# Patient Record
Sex: FEMALE | Race: WHITE | URBAN - METROPOLITAN AREA
[De-identification: names, ages, dates, MRNs, and addresses within clinical notes are randomized per-mention and may not be internally consistent; named-entity substitution may affect disease eponyms.]

---

## 2022-04-13 ENCOUNTER — OFFICE VISIT (OUTPATIENT)
Dept: URBAN - METROPOLITAN AREA CLINIC 44 | Facility: CLINIC | Age: 67
End: 2022-04-13
Payer: MEDICARE

## 2022-04-13 DIAGNOSIS — H43.812 VITREOUS DEGENERATION, LEFT EYE: Primary | ICD-10-CM

## 2022-04-13 PROCEDURE — 99203 OFFICE O/P NEW LOW 30 MIN: CPT | Performed by: OPTOMETRIST

## 2022-04-13 NOTE — IMPRESSION/PLAN
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 Plan: Patient educated on findings. No vitreous cells, retinal tears/holes, or detachments observed today. Patient understands there is a small chance of developing a retinal tear or detachment, which can be vision threatening. Patient to RTC immediately if notice sudden onset or worsening flashing lights, floaters, or a curtain over vision. Patient is leaving for [facility] for the summer in 2 weeks She already has an appt scheduled with her optometrist in [facility] will keep her appt for follow up

## 2022-04-26 ENCOUNTER — OFFICE VISIT (OUTPATIENT)
Dept: URBAN - METROPOLITAN AREA CLINIC 44 | Facility: CLINIC | Age: 67
End: 2022-04-26
Payer: MEDICARE

## 2022-04-26 PROCEDURE — 99214 OFFICE O/P EST MOD 30 MIN: CPT | Performed by: OPTOMETRIST

## 2022-04-26 PROCEDURE — 92134 CPTRZ OPH DX IMG PST SGM RTA: CPT | Performed by: OPTOMETRIST

## 2022-04-26 ASSESSMENT — INTRAOCULAR PRESSURE
OD: 15
OS: 16

## 2022-04-26 NOTE — IMPRESSION/PLAN
Impression: Vitreous degeneration, left eye: H43.392. Plan: Patient noticed new symptoms today No vitreous cells, retinal tears/holes, or detachments observed today. Patient to RTC immediately if notice sudden onset or worsening flashing lights, floaters, or a curtain over vision. 
Recommend see [facility] before she leaves to rule out retinal break

## 2022-04-28 ENCOUNTER — OFFICE VISIT (OUTPATIENT)
Dept: URBAN - METROPOLITAN AREA CLINIC 54 | Facility: CLINIC | Age: 67
End: 2022-04-28
Payer: MEDICARE

## 2022-04-28 DIAGNOSIS — H33.312 HORSESHOE TEAR OF RETINA WITHOUT DETACHMENT, LEFT EYE: ICD-10-CM

## 2022-04-28 DIAGNOSIS — H43.812 VITREOUS DEGENERATION, LEFT EYE: Primary | ICD-10-CM

## 2022-04-28 DIAGNOSIS — H25.13 AGE-RELATED NUCLEAR CATARACT, BILATERAL: ICD-10-CM

## 2022-04-28 PROCEDURE — 67145 PROPH RTA DTCHMNT PC: CPT | Performed by: OPHTHALMOLOGY

## 2022-04-28 PROCEDURE — 99204 OFFICE O/P NEW MOD 45 MIN: CPT | Performed by: OPHTHALMOLOGY

## 2022-04-28 ASSESSMENT — INTRAOCULAR PRESSURE
OD: 18
OS: 16

## 2022-04-28 NOTE — IMPRESSION/PLAN
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 Left. OCT: 
OD: wnl OS: wnl Plan: Indirect ophthalmoscopy with scleral depression was performed and no retinal breaks or evidence of detachment were identified. The diagnosis, natural history, and prognosis of PVD were discussed at length. The signs and symptoms of retinal break/detachment including increased flashes, new-onset floaters, and development of a shadow/curtain shade in the visual field were reviewed. 

RTC PRN

## 2022-04-28 NOTE — IMPRESSION/PLAN
Impression: MARIBEL Yoedr seen OS Plan: Given new symptoms, I would recommend laser retinopexy. Completed today without complicaations.  

[facility]